# Patient Record
Sex: MALE | Race: WHITE | NOT HISPANIC OR LATINO | Employment: PART TIME | URBAN - METROPOLITAN AREA
[De-identification: names, ages, dates, MRNs, and addresses within clinical notes are randomized per-mention and may not be internally consistent; named-entity substitution may affect disease eponyms.]

---

## 2022-04-11 ENCOUNTER — HISTORICAL (OUTPATIENT)
Dept: ADMINISTRATIVE | Facility: HOSPITAL | Age: 32
End: 2022-04-11

## 2022-04-29 VITALS
DIASTOLIC BLOOD PRESSURE: 67 MMHG | HEIGHT: 70 IN | SYSTOLIC BLOOD PRESSURE: 103 MMHG | OXYGEN SATURATION: 97 % | WEIGHT: 280 LBS | BODY MASS INDEX: 40.09 KG/M2

## 2022-05-23 DIAGNOSIS — R06.02 SHORTNESS OF BREATH: Primary | ICD-10-CM

## 2022-08-18 ENCOUNTER — PROCEDURE VISIT (OUTPATIENT)
Dept: RESPIRATORY THERAPY | Facility: HOSPITAL | Age: 32
End: 2022-08-18
Payer: MEDICAID

## 2022-08-18 DIAGNOSIS — R06.02 SHORTNESS OF BREATH: ICD-10-CM

## 2022-08-18 LAB
DLCO ADJ PRE: 27.83 ML/(MIN*MMHG) (ref 19.58–33.43)
DLCO SINGLE BREATH LLN: 19.58
DLCO SINGLE BREATH PRE REF: 105 %
DLCO SINGLE BREATH REF: 26.51
DLCOC SBVA LLN: 3.42
DLCOC SBVA PRE REF: 79.6 %
DLCOC SBVA REF: 5.23
DLCOC SINGLE BREATH LLN: 19.58
DLCOC SINGLE BREATH PRE REF: 105 %
DLCOC SINGLE BREATH REF: 26.51
DLCOCSBVAULN: 7.05
DLCOCSINGLEBREATHULN: 33.43
DLCOSINGLEBREATHULN: 33.43
DLCOVA LLN: 3.42
DLCOVA PRE REF: 79.6 %
DLCOVA PRE: 4.17 ML/(MIN*MMHG*L) (ref 3.42–7.05)
DLCOVA REF: 5.23
DLCOVAULN: 7.05
DLVAADJ PRE: 4.17 ML/(MIN*MMHG*L) (ref 3.42–7.05)
ERV LLN: -16448.68
ERV PRE REF: 63.3 %
ERV REF: 1.32
ERVULN: ABNORMAL
FEF 25 75 LLN: 2.16
FEF 25 75 PRE REF: 106.4 %
FEF 25 75 REF: 3.48
FEV1 FVC LLN: 74
FEV1 FVC PRE REF: 91 %
FEV1 FVC REF: 85
FEV1 LLN: 2.52
FEV1 PRE REF: 136.4 %
FEV1 REF: 3.16
FRCPLETH LLN: 1.75
FRCPLETH PREREF: 111.2 %
FRCPLETH REF: 2.74
FRCPLETHULN: 3.72
FVC LLN: 3
FVC PRE REF: 149.9 %
FVC REF: 3.73
IVC PRE: 5.42 L (ref 3–4.45)
IVC SINGLE BREATH LLN: 3
IVC SINGLE BREATH PRE REF: 145.5 %
IVC SINGLE BREATH REF: 3.73
IVCSINGLEBREATHULN: 4.45
LLN IC: -9999997.53
MVV LLN: 108
MVV PRE REF: 95.2 %
MVV REF: 127
PEF LLN: 6.44
PEF PRE REF: 118.8 %
PEF REF: 8.13
PRE DLCO: 27.83 ML/(MIN*MMHG) (ref 19.58–33.43)
PRE ERV: 0.83 L (ref -16448.68–16451.32)
PRE FEF 25 75: 3.71 L/S (ref 2.16–4.81)
PRE FET 100: 8.94 SEC
PRE FEV1 FVC: 77.14 % (ref 73.6–95.96)
PRE FEV1: 4.31 L (ref 2.52–3.8)
PRE FRC PL: 3.04 L
PRE FVC: 5.59 L (ref 3–4.45)
PRE IC: 4.54 L (ref -9999997.53–#######.####)
PRE MVV: 121 L/MIN (ref 107.98–146.1)
PRE PEF: 9.66 L/S (ref 6.44–9.82)
PRE REF IC: 184 %
PRE RV: 1.99 L (ref 0.75–2.1)
PRE TLC: 7.58 L (ref 3.91–6.22)
PRE VTG: 2.72 L
RAW PRE REF: 145.1 %
RAW PRE: 4.44 CMH2O*S/L (ref 3.06–3.06)
RAW REF: 3.06
REF IC: 2.47
RV LLN: 0.75
RV PRE REF: 140.3 %
RV REF: 1.42
RVTLC LLN: 17
RVTLC PRE REF: 102.5 %
RVTLC PRE: 26.3 % (ref 16.68–34.64)
RVTLC REF: 26
RVTLCULN: 35
RVULN: 2.1
SGAW PRE REF: 88.8 %
SGAW PRE: 0.07 1/(CMH2O*S) (ref 0.08–0.08)
SGAW REF: 0.08
TLC LLN: 3.91
TLC PRE REF: 149.7 %
TLC REF: 5.06
TLC ULN: 6.22
ULN IC: ABNORMAL
VA PRE: 6.68 L (ref 4.91–4.91)
VA SINGLE BREATH PRE REF: 135.9 %
VA SINGLE BREATH REF: 4.91
VC LLN: 3
VC PRE REF: 149.9 %
VC PRE: 5.59 L (ref 3–4.45)
VC REF: 3.73
VC ULN: 4.45

## 2022-08-18 PROCEDURE — 94729 DIFFUSING CAPACITY: CPT

## 2022-08-18 PROCEDURE — 94010 BREATHING CAPACITY TEST: CPT

## 2022-08-18 PROCEDURE — 99900035 HC TECH TIME PER 15 MIN (STAT)

## 2022-08-18 PROCEDURE — 94727 GAS DIL/WSHOT DETER LNG VOL: CPT

## 2022-08-23 DIAGNOSIS — D35.1 BENIGN NEOPLASM OF PARATHYROID GLAND: Primary | ICD-10-CM

## 2022-09-15 ENCOUNTER — OFFICE VISIT (OUTPATIENT)
Dept: OTOLARYNGOLOGY | Facility: CLINIC | Age: 32
End: 2022-09-15
Payer: MEDICAID

## 2022-09-15 VITALS
BODY MASS INDEX: 42.38 KG/M2 | SYSTOLIC BLOOD PRESSURE: 115 MMHG | HEART RATE: 71 BPM | WEIGHT: 296 LBS | TEMPERATURE: 99 F | DIASTOLIC BLOOD PRESSURE: 76 MMHG

## 2022-09-15 DIAGNOSIS — D35.1 BENIGN NEOPLASM OF PARATHYROID GLAND: ICD-10-CM

## 2022-09-15 PROCEDURE — 99213 OFFICE O/P EST LOW 20 MIN: CPT | Mod: PBBFAC

## 2022-09-15 RX ORDER — BUSPIRONE HYDROCHLORIDE 15 MG/1
15 TABLET ORAL 2 TIMES DAILY
COMMUNITY
Start: 2022-09-04

## 2022-09-15 RX ORDER — BUPROPION HYDROCHLORIDE 300 MG/1
300 TABLET ORAL DAILY
COMMUNITY
Start: 2022-05-05

## 2022-09-15 RX ORDER — PANTOPRAZOLE SODIUM 40 MG/1
40 TABLET, DELAYED RELEASE ORAL DAILY
COMMUNITY
Start: 2022-08-04

## 2022-09-19 ENCOUNTER — LAB VISIT (OUTPATIENT)
Dept: LAB | Facility: HOSPITAL | Age: 32
End: 2022-09-19
Attending: STUDENT IN AN ORGANIZED HEALTH CARE EDUCATION/TRAINING PROGRAM
Payer: MEDICAID

## 2022-09-19 DIAGNOSIS — D35.1 BENIGN NEOPLASM OF PARATHYROID GLAND: ICD-10-CM

## 2022-09-19 LAB
ALBUMIN SERPL-MCNC: 4 GM/DL (ref 3.5–5)
BUN SERPL-MCNC: 10.3 MG/DL (ref 8.9–20.6)
CALCIUM SERPL-MCNC: 9.8 MG/DL (ref 8.4–10.2)
CHLORIDE SERPL-SCNC: 105 MMOL/L (ref 98–107)
CO2 SERPL-SCNC: 26 MMOL/L (ref 22–29)
CREAT SERPL-MCNC: 1.27 MG/DL (ref 0.73–1.18)
GFR SERPLBLD CREATININE-BSD FMLA CKD-EPI: >60 MLS/MIN/1.73/M2
GLUCOSE SERPL-MCNC: 103 MG/DL (ref 74–100)
PHOSPHATE SERPL-MCNC: 3.1 MG/DL (ref 2.3–4.7)
POTASSIUM SERPL-SCNC: 4 MMOL/L (ref 3.5–5.1)
PTH-INTACT SERPL-MCNC: 36.3 PG/ML (ref 8.7–77)
SODIUM SERPL-SCNC: 141 MMOL/L (ref 136–145)

## 2022-09-19 PROCEDURE — 36415 COLL VENOUS BLD VENIPUNCTURE: CPT

## 2022-09-19 PROCEDURE — 83970 ASSAY OF PARATHORMONE: CPT

## 2022-09-19 PROCEDURE — 80069 RENAL FUNCTION PANEL: CPT

## 2022-10-11 ENCOUNTER — OFFICE VISIT (OUTPATIENT)
Dept: OTOLARYNGOLOGY | Facility: CLINIC | Age: 32
End: 2022-10-11
Payer: MEDICAID

## 2022-10-11 DIAGNOSIS — E83.52 HYPERCALCEMIA: ICD-10-CM

## 2022-10-11 DIAGNOSIS — D35.1 PARATHYROID ADENOMA: Primary | ICD-10-CM

## 2022-10-11 DIAGNOSIS — E04.1 THYROID NODULE: ICD-10-CM

## 2022-10-11 NOTE — PROGRESS NOTES
Established Patient - Audio Only Telehealth Visit     The patient location is: [facility]  The chief complaint leading to consultation is: Hypercalcemia  Visit type: Virtual visit with audio only (telephone)  Total time spent with patient: 10 minutes       The reason for the audio only service rather than synchronous audio and video virtual visit was related to technical difficulties or patient preference/necessity.     Each patient to whom I provide medical services by telemedicine is:  (1) informed of the relationship between the physician and patient and the respective role of any other health care provider with respect to management of the patient; and (2) notified that they may decline to receive medical services by telemedicine and may withdraw from such care at any time. Patient verbally consented to receive this service via voice-only telephone call.       HPI: 32yM with hypercalcemia noted on routine labwork. Work-up revealed elevated PTH. No history of nephrolithiasis, bone pain, fractures, or family. Endorses stomach pains that he says are related to his cholecystectomy and poor diet. Underwent NM SPECT which showed R lower pole hot nodule, possible parathyroid adenoma vs thyroid nodule.     Repeat labs:  PTH 36  RFT Cr 1.27, Ca 9.8 (Alb 4.0)    US and NM SPECT done at [facility]    No TSH in system.     Assessment and plan:    Repeat labs, PTH, RFT and TSH in ~1-2 weeks (will be 4 weeks from last set).   US FNA of thyroid nodule  RTC 2 weeks, telemed. If c/w parathyroid adenoma, indications for excision include age and nonspecific fatigue and stomach ache symptoms reported previously If c/w thyroid nodule, thyroidectomy pending on pathology.                   This service was not originating from a related E/M service provided within the previous 7 days nor will  to an E/M service or procedure within the next 24 hours or my soonest available appointment.  Prevailing standard of care was able to be met  in this audio-only visit.        Last Visit      [facility]  Otolaryngology Clinic Note    Shelby N Shone  Encounter Date: 10/11/2022  YOB: 1990  Physician: Carlota Chowdhury MD    Chief Complaint: Parathyroid adenoma    HPI: Shelby N Shone is a 32 y.o. male referred for evaluation of parathyroid adenoma. Patient reportedly had labs drawn with his surgeon which revealed an elevated calcium. Subsequently underwent imaging including ultrasound and SPECT CT which revealed a lesion concerning for a right inferior parathyroid adenoma. Patient endorses generalized aches. No broken bones. No history of nephrolithiasis.     ROS:   General: Negative except per HPI  Skin: Denies rash, ulcer, or lesion.  Eyes: Denies vision changes or diplopia.  Ears: Negative except per HPI  Nose: Negative except per HPI  Throat/mouth: Negative except per HPI  Cardiovascular: Negative except per HPI  Respiratory: Negative except per HPI  Neck: Negative except per HPI  Endocrine: Negative except per HPI  Neurologic: Negative except per HPI    Other 10-point review of systems negative except per HPI      Review of patient's allergies indicates:   Allergen Reactions    Penicillins Hives and Rash       History reviewed. No pertinent past medical history.    Past Surgical History:   Procedure Laterality Date    CHOLECYSTECTOMY         Social History     Socioeconomic History    Marital status: Single   Tobacco Use    Smoking status: Never    Smokeless tobacco: Never   Substance and Sexual Activity    Drug use: Yes     Types: Marijuana       History reviewed. No pertinent family history.    Outpatient Encounter Medications as of 10/11/2022   Medication Sig Dispense Refill    buPROPion (WELLBUTRIN XL) 300 MG 24 hr tablet Take 300 mg by mouth once daily.      busPIRone (BUSPAR) 15 MG tablet Take 15 mg by mouth 2 (two) times daily.      pantoprazole (PROTONIX) 40 MG tablet Take 40 mg by mouth once daily.        No facility-administered encounter medications on file as of 10/11/2022.       Physical Exam:  There were no vitals filed for this visit.      Constitutional  General Appearance: well nourished, well-developed, AAO x3, NAD  HEENT  Eyes: PEERLA, EOMI, normal conjunctivae  Ears: Hearing well at conversation level   AD: auricle normal, EAC normal, TM intact, no HAROLDO   AS: auricle normal, EAC normal, TM intact, no HAROLDO  Nose: septum midline, no inferior turbinate hypertrophy, no polyps, moist mucosa without erythema or blue hue  OC/OP: dentition moderate, no oral lesions, tongue/FOM/BOT- soft, no leukoplakia/ulcerations/ tenderness, tonsils +  Neck: soft, non-tender, no palpable lymph nodes   Thyroid region- no nodules or goiter  Neuro: CN II - XII intact bilaterally  Cardiovascular: peripheral pulses palpable  Respiratory: non-labored respirations  Psychiatric: oriented to time, place and person, no depression, anxiety or agitation    Pertinent Data:  US thyroid 8/15/22  FINDINGS: The left lobe measures 1.5 x 1.3 x 4.8 cm. It is homogeneous in appearance with no solid or cystic nodules. The right lobe measures 1.4 x 2.0 x 5.0 cm. At or adjacent to the lower pole, there is a hypoechoic solid nodule measuring 7 x 6 x 16 mm, corresponding to the location of abnormal tracer localization on the nuclear medicine study. No other solid or cystic nodules are seen. The soft tissues are unremarkable.    NM Parathyroid Spect 8/11/22  FINDINGS:   Planar images show asymmetric thyroid activity to the right on early images with complete washout on delayed images   SPECT-CT images show intense activity involving the mid and lower pole of the right thyroid gland without posterior extension.  IMPRESSION:  Intense mid and lower pole right thyroid activity, potential thyroid adenoma/carcinoma or parathyroid adenoma. Consider follow-up evaluation with thyroid ultrasound for anatomic correlation.    Assessment/Plan:  Shelby N Shone is a  32 y.o. male referred for parathyroid adenoma. Imaging reveals right lower pole lesion. No labs available for review. Patient otherwise asymptomatic.   - Will order PTH and calcium levels.   - Although patient is asymptomatic, he may meet criteria depending on the results of his lab studies due to his age.  If labs WNL, can continue to monitor. We will discuss at a telemdicine visit in 2 weeks.

## 2022-10-26 ENCOUNTER — LAB VISIT (OUTPATIENT)
Dept: LAB | Facility: HOSPITAL | Age: 32
End: 2022-10-26
Attending: STUDENT IN AN ORGANIZED HEALTH CARE EDUCATION/TRAINING PROGRAM
Payer: MEDICAID

## 2022-10-26 DIAGNOSIS — E04.1 THYROID NODULE: ICD-10-CM

## 2022-10-26 DIAGNOSIS — D35.1 PARATHYROID ADENOMA: ICD-10-CM

## 2022-10-26 DIAGNOSIS — E83.52 HYPERCALCEMIA: ICD-10-CM

## 2022-10-26 LAB
ALBUMIN SERPL-MCNC: 4.2 GM/DL (ref 3.5–5)
BUN SERPL-MCNC: 7.7 MG/DL (ref 8.9–20.6)
CALCIUM SERPL-MCNC: 9.7 MG/DL (ref 8.4–10.2)
CHLORIDE SERPL-SCNC: 104 MMOL/L (ref 98–107)
CO2 SERPL-SCNC: 27 MMOL/L (ref 22–29)
CREAT SERPL-MCNC: 1.02 MG/DL (ref 0.73–1.18)
GFR SERPLBLD CREATININE-BSD FMLA CKD-EPI: >60 MLS/MIN/1.73/M2
GLUCOSE SERPL-MCNC: 100 MG/DL (ref 74–100)
PHOSPHATE SERPL-MCNC: 3.6 MG/DL (ref 2.3–4.7)
POTASSIUM SERPL-SCNC: 5.1 MMOL/L (ref 3.5–5.1)
PTH-INTACT SERPL-MCNC: 87.5 PG/ML (ref 8.7–77)
SODIUM SERPL-SCNC: 143 MMOL/L (ref 136–145)
TSH SERPL-ACNC: 1.46 UIU/ML (ref 0.35–4.94)

## 2022-10-26 PROCEDURE — 80069 RENAL FUNCTION PANEL: CPT

## 2022-10-26 PROCEDURE — 83970 ASSAY OF PARATHORMONE: CPT

## 2022-10-26 PROCEDURE — 84443 ASSAY THYROID STIM HORMONE: CPT

## 2022-10-26 PROCEDURE — 36415 COLL VENOUS BLD VENIPUNCTURE: CPT

## 2022-11-10 ENCOUNTER — HOSPITAL ENCOUNTER (OUTPATIENT)
Dept: RADIOLOGY | Facility: HOSPITAL | Age: 32
Discharge: HOME | End: 2022-11-10
Attending: STUDENT IN AN ORGANIZED HEALTH CARE EDUCATION/TRAINING PROGRAM
Payer: MEDICAID

## 2022-11-10 DIAGNOSIS — E04.1 THYROID NODULE: ICD-10-CM

## 2022-11-10 DIAGNOSIS — D35.1 PARATHYROID ADENOMA: ICD-10-CM

## 2022-11-10 PROCEDURE — 76536 US EXAM OF HEAD AND NECK: CPT | Mod: TC

## 2022-11-14 ENCOUNTER — TELEPHONE (OUTPATIENT)
Dept: INTERVENTIONAL RADIOLOGY/VASCULAR | Facility: HOSPITAL | Age: 32
End: 2022-11-14
Payer: MEDICAID

## 2022-11-14 NOTE — TELEPHONE ENCOUNTER
This referral has been reviewed by Dr. Ruiz. Questionable right thyroid vs parathyroid nodule is 1cm max diameter. He recommends getting a dedicated 4D parathyroid protocol CT neck w/wo to evaluate enhancement characteristics and look for additional candidate parathyroid lesions otherwise, the thyroid nodule is likely too small to ensure adequate targeted biopsy

## 2022-11-16 ENCOUNTER — TELEPHONE (OUTPATIENT)
Dept: OTOLARYNGOLOGY | Facility: CLINIC | Age: 32
End: 2022-11-16
Payer: MEDICAID

## 2022-11-16 DIAGNOSIS — E83.52 HYPERCALCEMIA: ICD-10-CM

## 2022-11-16 DIAGNOSIS — R52 GENERALIZED BODY ACHES: ICD-10-CM

## 2022-11-16 DIAGNOSIS — D35.1 PARATHYROID ADENOMA: Primary | ICD-10-CM

## 2022-11-16 DIAGNOSIS — R53.83 FATIGUE, UNSPECIFIED TYPE: ICD-10-CM

## 2022-11-16 NOTE — TELEPHONE ENCOUNTER
Spoke with patient regarding FNA of thyroid nodule. IR feels unable to get adequate sample based on size. Discussed options given evidence of parathyroid adenoma and hyperparathyroidism, consideration for parathyroidectomy. Will schedule for formal in person clinic visit in the next couple weeks.

## 2022-12-06 ENCOUNTER — OFFICE VISIT (OUTPATIENT)
Dept: OTOLARYNGOLOGY | Facility: CLINIC | Age: 32
End: 2022-12-06
Payer: MEDICAID

## 2022-12-06 VITALS
HEART RATE: 69 BPM | DIASTOLIC BLOOD PRESSURE: 83 MMHG | WEIGHT: 283 LBS | SYSTOLIC BLOOD PRESSURE: 127 MMHG | TEMPERATURE: 99 F | BODY MASS INDEX: 40.51 KG/M2

## 2022-12-06 DIAGNOSIS — E21.3 HYPERPARATHYROIDISM: Primary | ICD-10-CM

## 2022-12-06 DIAGNOSIS — D35.1 PARATHYROID ADENOMA: ICD-10-CM

## 2022-12-06 PROCEDURE — 99213 OFFICE O/P EST LOW 20 MIN: CPT | Mod: PBBFAC | Performed by: STUDENT IN AN ORGANIZED HEALTH CARE EDUCATION/TRAINING PROGRAM

## 2022-12-06 NOTE — PROGRESS NOTES
[facility]  Otolaryngology Clinic Note    Shelby N Shone  Encounter Date: 12/6/2022  YOB: 1990  Physician: Shanon Marquez MD    Chief Complaint: Parathyroid adenoma    HPI: Shelby N Shone is a 32 y.o. male referred for evaluation of parathyroid adenoma. Patient reportedly had labs drawn with his surgeon which revealed an elevated calcium. Subsequently underwent imaging including ultrasound and SPECT CT which revealed a lesion concerning for a right inferior parathyroid adenoma. Patient endorses generalized aches. No broken bones. No history of nephrolithiasis.     10/11/22: 32yM with hypercalcemia noted on routine labwork. Work-up revealed elevated PTH. No history of nephrolithiasis, bone pain, fractures, or family. Endorses stomach pains that he says are related to his cholecystectomy and poor diet. Underwent NM SPECT which showed R lower pole hot nodule, possible parathyroid adenoma vs thyroid nodule.     Repeat labs:  PTH 36  RFT Cr 1.27, Ca 9.8 (Alb 4.0)    US and NM SPECT done at [facility]    No TSH in system.     12/6/22: No complaints. Denies any symptoms of hypercalcemia. Denies any family history of parathyroid or calcium issues.     ROS:   General: Negative except per HPI  Skin: Denies rash, ulcer, or lesion.  Eyes: Denies vision changes or diplopia.  Ears: Negative except per HPI  Nose: Negative except per HPI  Throat/mouth: Negative except per HPI  Cardiovascular: Negative except per HPI  Respiratory: Negative except per HPI  Neck: Negative except per HPI  Endocrine: Negative except per HPI  Neurologic: Negative except per HPI    Other 10-point review of systems negative except per HPI      Review of patient's allergies indicates:   Allergen Reactions    Penicillins Hives and Rash       History reviewed. No pertinent past medical history.    Past Surgical History:   Procedure Laterality Date    CHOLECYSTECTOMY         Social History     Socioeconomic History     Marital status: Single   Tobacco Use    Smoking status: Never    Smokeless tobacco: Never   Substance and Sexual Activity    Drug use: Yes     Types: Marijuana       History reviewed. No pertinent family history.    Outpatient Encounter Medications as of 12/6/2022   Medication Sig Dispense Refill    buPROPion (WELLBUTRIN XL) 300 MG 24 hr tablet Take 300 mg by mouth once daily.      busPIRone (BUSPAR) 15 MG tablet Take 15 mg by mouth 2 (two) times daily.      pantoprazole (PROTONIX) 40 MG tablet Take 40 mg by mouth once daily.       No facility-administered encounter medications on file as of 12/6/2022.       Physical Exam:  Vitals:    12/06/22 1333   BP: 127/83   Pulse: 69   Temp: 98.7 °F (37.1 °C)   Weight: 128.4 kg (283 lb)         Constitutional  General Appearance: well nourished, well-developed, AAO x3, NAD  HEENT  Eyes: PEERLA, EOMI, normal conjunctivae  Ears: Hearing well at conversation level   AD: auricle normal, EAC normal, TM intact, no HAROLDO   AS: auricle normal, EAC normal, TM intact, no HAROLDO  Nose: septum midline, no inferior turbinate hypertrophy, no polyps, moist mucosa without erythema or blue hue  OC/OP: dentition moderate, no oral lesions, tongue/FOM/BOT- soft, no leukoplakia/ulcerations/ tenderness, tonsils +  Neck: soft, non-tender, no palpable lymph nodes   Thyroid region- no nodules or goiter  Neuro: CN II - XII intact bilaterally  Cardiovascular: peripheral pulses palpable  Respiratory: non-labored respirations  Psychiatric: oriented to time, place and person, no depression, anxiety or agitation    Pertinent Data:  US thyroid 8/15/22  FINDINGS: The left lobe measures 1.5 x 1.3 x 4.8 cm. It is homogeneous in appearance with no solid or cystic nodules. The right lobe measures 1.4 x 2.0 x 5.0 cm. At or adjacent to the lower pole, there is a hypoechoic solid nodule measuring 7 x 6 x 16 mm, corresponding to the location of abnormal tracer localization on the nuclear medicine study. No other solid or  cystic nodules are seen. The soft tissues are unremarkable.    NM Parathyroid Spect 8/11/22  FINDINGS:   Planar images show asymmetric thyroid activity to the right on early images with complete washout on delayed images   SPECT-CT images show intense activity involving the mid and lower pole of the right thyroid gland without posterior extension.  IMPRESSION:  Intense mid and lower pole right thyroid activity, potential thyroid adenoma/carcinoma or parathyroid adenoma. Consider follow-up evaluation with thyroid ultrasound for anatomic correlation.    Assessment/Plan:  Shelby N Shone is a 32 y.o. male referred for parathyroid adenoma. Imaging reveals possible right lower pole lesion. PTH 87.5 x1, prior PTH normal. No Vit D labs. Ca normal.     Referral to endocrinology for further work-up  Vitamin D levels ordered  If consistent with primary hyperparathyroidism, re-refer to ENT    RTC 4-6 months to ensure not lost to follow-up.    Shanon Marquez MD  Otolaryngology-Head & Neck Surgery  [employer] PGY5

## 2022-12-12 NOTE — PROGRESS NOTES
I reviewed the history and physical exam with the resident.   I agree with findings and plan.    Emmanuel Banks M.D.

## 2023-04-11 ENCOUNTER — OFFICE VISIT (OUTPATIENT)
Dept: OTOLARYNGOLOGY | Facility: CLINIC | Age: 33
End: 2023-04-11
Payer: MEDICAID

## 2023-04-11 VITALS
BODY MASS INDEX: 39.66 KG/M2 | DIASTOLIC BLOOD PRESSURE: 79 MMHG | HEART RATE: 62 BPM | SYSTOLIC BLOOD PRESSURE: 116 MMHG | WEIGHT: 277 LBS

## 2023-04-11 DIAGNOSIS — E04.1 THYROID NODULE: ICD-10-CM

## 2023-04-11 DIAGNOSIS — E83.52 HYPERCALCEMIA: ICD-10-CM

## 2023-04-11 DIAGNOSIS — E21.3 HYPERPARATHYROIDISM: Primary | ICD-10-CM

## 2023-04-11 PROCEDURE — 99213 OFFICE O/P EST LOW 20 MIN: CPT | Mod: PBBFAC | Performed by: STUDENT IN AN ORGANIZED HEALTH CARE EDUCATION/TRAINING PROGRAM

## 2023-04-11 NOTE — PROGRESS NOTES
[facility]  Otolaryngology Clinic Note    Shelby N Shone  Encounter Date: 4/11/2023  YOB: 1990  Physician: Carolina Quiñones MD    Chief Complaint: Parathyroid adenoma    HPI: Shelby N Shone is a 33 y.o. male referred for evaluation of parathyroid adenoma. Patient reportedly had labs drawn with his surgeon which revealed an elevated calcium. Subsequently underwent imaging including ultrasound and SPECT CT which revealed a lesion concerning for a right inferior parathyroid adenoma. Patient endorses generalized aches. No broken bones. No history of nephrolithiasis.     10/11/22: 32yM with hypercalcemia noted on routine labwork. Work-up revealed elevated PTH. No history of nephrolithiasis, bone pain, fractures, or family. Endorses stomach pains that he says are related to his cholecystectomy and poor diet. Underwent NM SPECT which showed R lower pole hot nodule, possible parathyroid adenoma vs thyroid nodule.     Repeat labs:  PTH 36  RFT Cr 1.27, Ca 9.8 (Alb 4.0)    US and NM SPECT done at [facility]    No TSH in system.     12/6/22: No complaints. Denies any symptoms of hypercalcemia. Denies any family history of parathyroid or calcium issues.     4/11/23: Here today for follow up. Not able to get into endocrinology until February of 2024. Has not gotten vitamin D levels ordered at the last appt. Reports no new issues with his health. Had to go to the ER recently due to a panic attack. On medications for anxiety. Hasn't seen PCP recently.     ROS:   General: Negative except per HPI  Skin: Denies rash, ulcer, or lesion.  Eyes: Denies vision changes or diplopia.  Ears: Negative except per HPI  Nose: Negative except per HPI  Throat/mouth: Negative except per HPI  Cardiovascular: Negative except per HPI  Respiratory: Negative except per HPI  Neck: Negative except per HPI  Endocrine: Negative except per HPI  Neurologic: Negative except per HPI    Other 10-point review of systems  negative except per HPI      Review of patient's allergies indicates:   Allergen Reactions    Penicillins Hives and Rash       History reviewed. No pertinent past medical history.    Past Surgical History:   Procedure Laterality Date    CHOLECYSTECTOMY         Social History     Socioeconomic History    Marital status: Single   Tobacco Use    Smoking status: Never    Smokeless tobacco: Never   Substance and Sexual Activity    Drug use: Yes     Types: Marijuana       History reviewed. No pertinent family history.    Outpatient Encounter Medications as of 4/11/2023   Medication Sig Dispense Refill    buPROPion (WELLBUTRIN XL) 300 MG 24 hr tablet Take 300 mg by mouth once daily.      busPIRone (BUSPAR) 15 MG tablet Take 15 mg by mouth 2 (two) times daily.      pantoprazole (PROTONIX) 40 MG tablet Take 40 mg by mouth once daily.       No facility-administered encounter medications on file as of 4/11/2023.       Physical Exam:  Vitals:    04/11/23 0900   BP: 116/79   Pulse: 62   Weight: 125.6 kg (277 lb)         Constitutional  General Appearance: well nourished, well-developed, AAO x3, NAD  HEENT  Eyes: PEERLA, EOMI, normal conjunctivae  Ears: Hearing well at conversation level   AD: auricle normal, EAC normal, TM intact, no HAROLDO   AS: auricle normal, EAC normal, TM intact, no HAROLDO  Nose: septum midline, no inferior turbinate hypertrophy, no polyps, moist mucosa without erythema or blue hue  OC/OP: dentition moderate, no oral lesions, tongue/FOM/BOT- soft, no leukoplakia/ulcerations/ tenderness, tonsils +  Neck: soft, non-tender, no palpable lymph nodes   Thyroid region- no nodules or goiter  Neuro: CN II - XII intact bilaterally  Cardiovascular: peripheral pulses palpable  Respiratory: non-labored respirations  Psychiatric: oriented to time, place and person, no depression, anxiety or agitation    Pertinent Data:  US thyroid 11/2022  US THYROID     CLINICAL HISTORY:  , Benign neoplasm of parathyroid gland.      TECHNIQUE:  Multiple transverse and sagittal grayscale sonographic images were acquired through the thyroid gland.     FINDINGS:  Right hemithyroid measures 5.4 x 2 x 2.2 cm, left jean paul thyroid measures 3.9 x 1.5 x 1.6 cm isthmus measures 3 mm in thickness.     On the right there is a solid hypoechoic nodule that measures 1 cm x 6 mm x 8 mm this corresponds to a TI-RADS 4 nodule follow-up recommended if greater than 1 cm at 1 year, 2 years, 3 years and 5 years biopsy recommended if greater than 1.5 cm.     No other significant abnormalities identified     Impression:     Nodule as above.     Recommendations as above       NM Parathyroid Spect 8/11/22 from [facility], report visible in care everywhere tab  FINDINGS:   Planar images show asymmetric thyroid activity to the right on early images with complete washout on delayed images   SPECT-CT images show intense activity involving the mid and lower pole of the right thyroid gland without posterior extension.  IMPRESSION:  Intense mid and lower pole right thyroid activity, potential thyroid adenoma/carcinoma or parathyroid adenoma. Consider follow-up evaluation with thyroid ultrasound for anatomic correlation.    Assessment/Plan:  Shelby N Shone is a 33 y.o. male referred for parathyroid adenoma. Imaging reveals possible right lower pole lesion. PTH 87.5 x1, prior PTH normal. No Vit D labs. Ca normal.   - Endocrinology visit scheduled for 2/2024. If their workup is consistent with primary hyperparathyroidism, can consider surgery at that time.   - Vitamin D again ordered today.   - US thyroid in 11/2022 with a 1cm x 6mm x 8mm nodule. F/u US ordered for 6 months from now which will be 1 year from the previous scan. If the nodule grows to greater than 1.5cm can perform FNA.   - RTC in about 1 year after endocrinology appt.     Carolina Quiñones MD  Otolaryngology-Head & Neck Surgery  [employer] PGY4

## 2023-08-17 ENCOUNTER — HOSPITAL ENCOUNTER (OUTPATIENT)
Dept: RADIOLOGY | Facility: HOSPITAL | Age: 33
Discharge: HOME | End: 2023-08-17
Attending: STUDENT IN AN ORGANIZED HEALTH CARE EDUCATION/TRAINING PROGRAM
Payer: MEDICAID

## 2023-08-17 DIAGNOSIS — E04.1 THYROID NODULE: ICD-10-CM

## 2023-08-17 DIAGNOSIS — E21.3 HYPERPARATHYROIDISM: ICD-10-CM

## 2023-08-17 PROCEDURE — 76536 US EXAM OF HEAD AND NECK: CPT | Mod: TC
